# Patient Record
Sex: MALE | Race: BLACK OR AFRICAN AMERICAN | NOT HISPANIC OR LATINO | Employment: UNEMPLOYED | URBAN - NONMETROPOLITAN AREA
[De-identification: names, ages, dates, MRNs, and addresses within clinical notes are randomized per-mention and may not be internally consistent; named-entity substitution may affect disease eponyms.]

---

## 2020-04-24 PROBLEM — F29 PSYCHOSIS: Status: ACTIVE | Noted: 2020-04-24

## 2020-04-26 PROBLEM — F16.20: Status: ACTIVE | Noted: 2020-04-26

## 2020-04-26 PROBLEM — F29 PSYCHOSIS: Status: RESOLVED | Noted: 2020-04-24 | Resolved: 2020-04-26

## 2023-10-19 PROBLEM — I67.1 CEREBRAL ANEURYSM: Status: ACTIVE | Noted: 2023-10-19

## 2023-10-19 PROBLEM — R05.3 CHRONIC COUGH: Status: ACTIVE | Noted: 2023-10-19

## 2023-10-19 PROBLEM — Z22.7 TB LUNG, LATENT: Status: ACTIVE | Noted: 2023-10-19

## 2023-10-19 PROBLEM — R59.0 HILAR LYMPHADENOPATHY: Status: ACTIVE | Noted: 2023-10-19

## 2023-10-19 PROBLEM — H49.9: Status: ACTIVE | Noted: 2023-10-19

## 2023-10-19 PROBLEM — R63.4 WEIGHT LOSS: Status: ACTIVE | Noted: 2023-10-19

## 2023-11-20 PROBLEM — H49.21 CN VI PALSY, RIGHT EYE: Status: ACTIVE | Noted: 2023-11-20

## 2023-11-20 PROBLEM — D86.83 SARCOID UVEITIS OF BOTH EYES: Status: ACTIVE | Noted: 2023-11-20

## 2023-11-27 PROBLEM — D86.0 SARCOIDOSIS OF LUNG: Status: ACTIVE | Noted: 2023-11-27

## 2024-07-15 ENCOUNTER — HOSPITAL ENCOUNTER (OUTPATIENT)
Dept: RADIOLOGY | Facility: HOSPITAL | Age: 44
Discharge: HOME | End: 2024-07-15
Attending: INTERNAL MEDICINE
Payer: MEDICAID

## 2024-07-15 DIAGNOSIS — D86.0 SARCOIDOSIS OF LUNG: ICD-10-CM

## 2024-07-15 DIAGNOSIS — J84.9 INTERSTITIAL PULMONARY DISEASE, UNSPECIFIED: ICD-10-CM

## 2024-07-15 PROCEDURE — 71250 CT THORAX DX C-: CPT | Mod: TC

## 2024-08-23 ENCOUNTER — TELEPHONE (OUTPATIENT)
Dept: OPHTHALMOLOGY | Facility: CLINIC | Age: 44
End: 2024-08-23
Payer: MEDICAID

## 2024-08-23 NOTE — TELEPHONE ENCOUNTER
Spoke with pt to schedule strab eval in [place] per referral. Informed pt that Dr. Levin has sooner availability at [facility]. Pt declined, stated that he would like to be seen in [place].   
Hpi Title: Evaluation of Skin Lesions
How Severe Are Your Spot(S)?: mild
Have Your Spot(S) Been Treated In The Past?: has not been treated

## 2024-08-23 NOTE — TELEPHONE ENCOUNTER
Attempted to contact pt to schedule strab/diplopia eval in [place] per referral. No answer/noVM.     -Nava

## 2024-11-05 ENCOUNTER — TELEPHONE (OUTPATIENT)
Dept: OPHTHALMOLOGY | Facility: CLINIC | Age: 44
End: 2024-11-05

## 2024-11-05 ENCOUNTER — OFFICE VISIT (OUTPATIENT)
Dept: OPHTHALMOLOGY | Facility: CLINIC | Age: 44
End: 2024-11-05
Payer: MEDICAID

## 2024-11-05 DIAGNOSIS — H49.21 CN VI PALSY, RIGHT EYE: Primary | ICD-10-CM

## 2024-11-05 DIAGNOSIS — H50.00 INTERMITTENT ESOTROPIA OF BOTH EYES: ICD-10-CM

## 2024-11-05 PROBLEM — H49.9: Status: RESOLVED | Noted: 2023-10-19 | Resolved: 2024-11-05

## 2024-11-05 PROCEDURE — 92060 SENSORIMOTOR EXAMINATION: CPT | Mod: ,,, | Performed by: STUDENT IN AN ORGANIZED HEALTH CARE EDUCATION/TRAINING PROGRAM

## 2024-11-05 PROCEDURE — 99213 OFFICE O/P EST LOW 20 MIN: CPT | Mod: ,,, | Performed by: STUDENT IN AN ORGANIZED HEALTH CARE EDUCATION/TRAINING PROGRAM

## 2024-11-05 PROCEDURE — 1160F RVW MEDS BY RX/DR IN RCRD: CPT | Mod: CPTII,,, | Performed by: STUDENT IN AN ORGANIZED HEALTH CARE EDUCATION/TRAINING PROGRAM

## 2024-11-05 PROCEDURE — 1159F MED LIST DOCD IN RCRD: CPT | Mod: CPTII,,, | Performed by: STUDENT IN AN ORGANIZED HEALTH CARE EDUCATION/TRAINING PROGRAM

## 2024-11-05 NOTE — ASSESSMENT & PLAN NOTE
"Hx of latent TB and sarcoid associated uveitis - managed with ophthalmology, rheum, and pulmonology    Patient with one year of diplopia - feels like it started suddenly one day  Seen at [facility] on 10/20/23 and noted to have a -4 abduction deficit of right eye  MRI Brain w/ and w/o 10/23/23: "Mild presumed microvascular ischemic changes cerebral white matter including couple of small old white matter infarcts.  No evidence of acute infarction. No space-occupying mass or abnormally enhancing parenchymal lesion"  MRI Head/Orbits w/ and w/o 7/18/24: "Scattered foci of chronic microvascular ischemic changes in the cerebral white matter bilaterally.  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  No definite abnormalities involving either orbit.    11/5/24: Patient reports double vision most every day, worse when looking right, but does think it has gradually improved over the past few months  Exam with intermittent esotropia with mild lateral incomitance    Plan:  Presumed microvascular etiology given radiographic evidence of microvascular ischemic disease and some subjective improvement   Atypical case given patient age and time course of improvement  Given atypical features, subjective improvement, and only intermittent ET, will recommend observation for another 2 months  RTC 2 months  If symptoms worsen or measurements stable, can consider strab surgery  "

## 2024-11-05 NOTE — PROGRESS NOTES
"HPI    NP with hx of sarcoid uveitis OU and CN VI palsy OD. Pt is experiencing   horizontal diplopia for the last year. Symptoms happen daily mainly when   looking to the right. Pt also states OD eye turning for about 1 year. He   does feel like the double vision has slightly improved over the last few   months. Mainly notices double vision when looking to the right.  Denies any photophobia, eye redness today        Last edited by Yohannes Levin MD on 11/5/2024 11:45 AM.        ROS    Negative for: Constitutional, Gastrointestinal, Neurological, Skin,   Genitourinary, Musculoskeletal, HENT, Endocrine, Cardiovascular, Eyes,   Respiratory, Psychiatric, Allergic/Imm, Heme/Lymph  Last edited by Yohannes Levin MD on 11/5/2024 11:45 AM.        Assessment /Plan     For exam results, see Encounter Report.    CN VI palsy, right eye  -     Ambulatory referral/consult to Ophthalmology    Intermittent esotropia of both eyes        Problem List Items Addressed This Visit          Ophtho    CN VI palsy, right eye - Primary    Current Assessment & Plan     Hx of latent TB and sarcoid associated uveitis - managed with ophthalmology, rheum, and pulmonology    Patient with one year of diplopia - feels like it started suddenly one day  Seen at [facility] on 10/20/23 and noted to have a -4 abduction deficit of right eye  MRI Brain w/ and w/o 10/23/23: "Mild presumed microvascular ischemic changes cerebral white matter including couple of small old white matter infarcts.  No evidence of acute infarction. No space-occupying mass or abnormally enhancing parenchymal lesion"  MRI Head/Orbits w/ and w/o 7/18/24: "Scattered foci of chronic microvascular ischemic changes in the cerebral white matter bilaterally.  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  No definite abnormalities involving either orbit.    11/5/24: Patient reports double vision most every day, worse when looking right, but does " think it has gradually improved over the past few months  Exam with intermittent esotropia with mild lateral incomitance    Plan:  Presumed microvascular etiology given radiographic evidence of microvascular ischemic disease and some subjective improvement   Atypical case given patient age and time course of improvement  Given atypical features, subjective improvement, and only intermittent ET, will recommend observation for another 2 months  RTC 2 months  If symptoms worsen or measurements stable, can consider strab surgery         Intermittent esotropia of both eyes      Yohannes Levin MD  Pediatric Ophthalmology and Adult Strabismus  [facility]

## 2024-11-05 NOTE — TELEPHONE ENCOUNTER
----- Message from Ambika sent at 11/5/2024 10:16 AM CST -----  Regarding: Wrong Location  Type:Running  Late to Appointment    Patient is running late to today's appointment.      Name of Caller: Jennifer elsi leon location    Appointment Date/Time? 11/5    Physician:Yohannes Levin MD    Best Call Back Number:[phone]    Additional Information: Patient went to wrong location [facility]. Pt is asking if he can still be seen due to driving from 1 hour away.

## 2025-01-07 ENCOUNTER — OFFICE VISIT (OUTPATIENT)
Dept: OPHTHALMOLOGY | Facility: CLINIC | Age: 45
End: 2025-01-07
Payer: MEDICAID

## 2025-01-07 DIAGNOSIS — H50.00 INTERMITTENT ESOTROPIA OF BOTH EYES: ICD-10-CM

## 2025-01-07 DIAGNOSIS — H49.21 CN VI PALSY, RIGHT EYE: Primary | ICD-10-CM

## 2025-01-07 PROCEDURE — 1159F MED LIST DOCD IN RCRD: CPT | Mod: CPTII,,, | Performed by: STUDENT IN AN ORGANIZED HEALTH CARE EDUCATION/TRAINING PROGRAM

## 2025-01-07 PROCEDURE — 99214 OFFICE O/P EST MOD 30 MIN: CPT | Mod: ,,, | Performed by: STUDENT IN AN ORGANIZED HEALTH CARE EDUCATION/TRAINING PROGRAM

## 2025-01-07 PROCEDURE — 92060 SENSORIMOTOR EXAMINATION: CPT | Mod: ,,, | Performed by: STUDENT IN AN ORGANIZED HEALTH CARE EDUCATION/TRAINING PROGRAM

## 2025-01-07 PROCEDURE — 1160F RVW MEDS BY RX/DR IN RCRD: CPT | Mod: CPTII,,, | Performed by: STUDENT IN AN ORGANIZED HEALTH CARE EDUCATION/TRAINING PROGRAM

## 2025-01-07 NOTE — ASSESSMENT & PLAN NOTE
"Hx of latent TB and sarcoid associated uveitis - managed with ophthalmology, rheum, and pulmonology    Patient with one year of diplopia - feels like it started suddenly one day  Seen at [facility] on 10/20/23 and noted to have a -4 abduction deficit of right eye  MRI Brain w/ and w/o 10/23/23: "Mild presumed microvascular ischemic changes cerebral white matter including couple of small old white matter infarcts.  No evidence of acute infarction. No space-occupying mass or abnormally enhancing parenchymal lesion"  MRI Head/Orbits w/ and w/o 7/18/24: "Scattered foci of chronic microvascular ischemic changes in the cerebral white matter bilaterally.  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  No definite abnormalities involving either orbit.    11/5/24: Patient reports double vision most every day, worse when looking right, but does think it has gradually improved over the past few months  Exam with intermittent esotropia with mild lateral incomitance  --Presumed microvascular etiology given radiographic evidence of microvascular ischemic disease and some subjective improvement   Given atypical features, subjective improvement, and only intermittent ET, will recommend observation for another 2 months    1/7/25: Patient repots stable symptoms. Still having double vision in right gaze preventing him from driving. Is interested in surgical correction  Exam stable  Plan:  Discussed risks and benefits of strabismus surgery  Benefits include: realign eyes and improved binocularity  Risks include: Pain, bleeding infection, transient or permanent redness, less attractive appearance, decreased vision, loss of vision, undercorrection, overcorrection, double vision, need for future surgery    Patient understands and elects to proceed with surgery  Will tentatively plan for [facility] only  "

## 2025-01-07 NOTE — PROGRESS NOTES
"HPI    Patient is 45 yo male returning to clinic for 2 month f/u of intermittent   esotropia OU, CN VI palsy OD. Patient has h/o latent TB and sarcoid   associated uveitis. Patient states eye will still deviate inward.  Patient   reports no changes to horizontal diplopia when looking to the right. He   reports the double vision has prevented him from driving.  Patient denies any uveitis related symptoms over the last 2 months.   Patient is using Durezol drops qday OU.   Last edited by Yohannes Levin MD on 1/7/2025  1:02 PM.            Assessment /Plan     For exam results, see Encounter Report.    CN VI palsy, right eye    Intermittent esotropia of both eyes        Problem List Items Addressed This Visit          Ophtho    CN VI palsy, right eye - Primary    Current Assessment & Plan     Hx of latent TB and sarcoid associated uveitis - managed with ophthalmology, rheum, and pulmonology    Patient with one year of diplopia - feels like it started suddenly one day  Seen at [facility] on 10/20/23 and noted to have a -4 abduction deficit of right eye  MRI Brain w/ and w/o 10/23/23: "Mild presumed microvascular ischemic changes cerebral white matter including couple of small old white matter infarcts.  No evidence of acute infarction. No space-occupying mass or abnormally enhancing parenchymal lesion"  MRI Head/Orbits w/ and w/o 7/18/24: "Scattered foci of chronic microvascular ischemic changes in the cerebral white matter bilaterally.  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  No definite abnormalities involving either orbit.    11/5/24: Patient reports double vision most every day, worse when looking right, but does think it has gradually improved over the past few months  Exam with intermittent esotropia with mild lateral incomitance  --Presumed microvascular etiology given radiographic evidence of microvascular ischemic disease and some subjective improvement   Given " atypical features, subjective improvement, and only intermittent ET, will recommend observation for another 2 months    1/7/25: Patient repots stable symptoms. Still having double vision in right gaze preventing him from driving. Is interested in surgical correction  Exam stable  Plan:  Discussed risks and benefits of strabismus surgery  Benefits include: realign eyes and improved binocularity  Risks include: Pain, bleeding infection, transient or permanent redness, less attractive appearance, decreased vision, loss of vision, undercorrection, overcorrection, double vision, need for future surgery    Patient understands and elects to proceed with surgery  Will tentatively plan for [facility] only         Intermittent esotropia of both eyes      Yohannes Levin MD  Pediatric Ophthalmology and Adult Strabismus  [facility]

## 2025-01-10 ENCOUNTER — TELEPHONE (OUTPATIENT)
Dept: OPHTHALMOLOGY | Facility: CLINIC | Age: 45
End: 2025-01-10
Payer: MEDICAID

## 2025-01-10 DIAGNOSIS — H50.00 INTERMITTENT ESOTROPIA OF BOTH EYES: Primary | ICD-10-CM

## 2025-01-27 ENCOUNTER — HOSPITAL ENCOUNTER (OUTPATIENT)
Dept: RADIOLOGY | Facility: HOSPITAL | Age: 45
Discharge: HOME | End: 2025-01-27
Attending: INTERNAL MEDICINE
Payer: MEDICAID

## 2025-01-27 DIAGNOSIS — D86.0 SARCOIDOSIS OF LUNG: ICD-10-CM

## 2025-01-27 PROCEDURE — 71046 X-RAY EXAM CHEST 2 VIEWS: CPT | Mod: TC

## 2025-02-03 ENCOUNTER — LAB VISIT (OUTPATIENT)
Dept: LAB | Facility: HOSPITAL | Age: 45
End: 2025-02-03
Attending: INTERNAL MEDICINE
Payer: MEDICAID

## 2025-02-03 DIAGNOSIS — D86.9 SARCOID: ICD-10-CM

## 2025-02-03 LAB
ALBUMIN SERPL BCP-MCNC: 3.8 G/DL (ref 3.5–5.2)
ALP SERPL-CCNC: 86 U/L (ref 55–135)
ALT SERPL W/O P-5'-P-CCNC: 17 U/L (ref 10–44)
ANION GAP SERPL CALC-SCNC: 7 MMOL/L (ref 8–16)
AST SERPL-CCNC: 25 U/L (ref 10–40)
BASOPHILS # BLD AUTO: 0.02 K/UL (ref 0–0.2)
BASOPHILS NFR BLD: 0.2 % (ref 0–1.9)
BILIRUB SERPL-MCNC: 0.4 MG/DL (ref 0.1–1)
BUN SERPL-MCNC: 8 MG/DL (ref 6–20)
CALCIUM SERPL-MCNC: 8.9 MG/DL (ref 8.7–10.5)
CHLORIDE SERPL-SCNC: 105 MMOL/L (ref 95–110)
CO2 SERPL-SCNC: 23 MMOL/L (ref 23–29)
CREAT SERPL-MCNC: 1 MG/DL (ref 0.5–1.4)
CRP SERPL-MCNC: 3 MG/L (ref 0–8.2)
DIFFERENTIAL METHOD BLD: ABNORMAL
EOSINOPHIL # BLD AUTO: 0 K/UL (ref 0–0.5)
EOSINOPHIL NFR BLD: 0.1 % (ref 0–8)
ERYTHROCYTE [DISTWIDTH] IN BLOOD BY AUTOMATED COUNT: 12.8 % (ref 11.5–14.5)
ERYTHROCYTE [SEDIMENTATION RATE] IN BLOOD: 3 MM/HR (ref 0–10)
EST. GFR  (NO RACE VARIABLE): >60 ML/MIN/1.73 M^2
GLUCOSE SERPL-MCNC: 103 MG/DL (ref 70–110)
HCT VFR BLD AUTO: 43 % (ref 40–54)
HGB BLD-MCNC: 14.6 G/DL (ref 14–18)
IMM GRANULOCYTES # BLD AUTO: 0.03 K/UL (ref 0–0.04)
IMM GRANULOCYTES NFR BLD AUTO: 0.4 % (ref 0–0.5)
LYMPHOCYTES # BLD AUTO: 1.2 K/UL (ref 1–4.8)
LYMPHOCYTES NFR BLD: 14.4 % (ref 18–48)
MCH RBC QN AUTO: 29.2 PG (ref 27–31)
MCHC RBC AUTO-ENTMCNC: 34 G/DL (ref 32–36)
MCV RBC AUTO: 86 FL (ref 82–98)
MONOCYTES # BLD AUTO: 0.3 K/UL (ref 0.3–1)
MONOCYTES NFR BLD: 3 % (ref 4–15)
NEUTROPHILS # BLD AUTO: 6.7 K/UL (ref 1.8–7.7)
NEUTROPHILS NFR BLD: 81.9 % (ref 38–73)
NRBC BLD-RTO: 0 /100 WBC
PLATELET # BLD AUTO: 400 K/UL (ref 150–450)
PMV BLD AUTO: 9.8 FL (ref 9.2–12.9)
POTASSIUM SERPL-SCNC: 3.2 MMOL/L (ref 3.5–5.1)
PROT SERPL-MCNC: 7.4 G/DL (ref 6–8.4)
RBC # BLD AUTO: 5 M/UL (ref 4.6–6.2)
SODIUM SERPL-SCNC: 135 MMOL/L (ref 136–145)
WBC # BLD AUTO: 8.22 K/UL (ref 3.9–12.7)

## 2025-02-03 PROCEDURE — 85651 RBC SED RATE NONAUTOMATED: CPT | Performed by: INTERNAL MEDICINE

## 2025-02-03 PROCEDURE — 86140 C-REACTIVE PROTEIN: CPT | Performed by: INTERNAL MEDICINE

## 2025-02-03 PROCEDURE — 85025 COMPLETE CBC W/AUTO DIFF WBC: CPT | Performed by: INTERNAL MEDICINE

## 2025-02-03 PROCEDURE — 36415 COLL VENOUS BLD VENIPUNCTURE: CPT | Performed by: INTERNAL MEDICINE

## 2025-02-03 PROCEDURE — 80053 COMPREHEN METABOLIC PANEL: CPT | Performed by: INTERNAL MEDICINE

## 2025-02-28 ENCOUNTER — TELEPHONE (OUTPATIENT)
Dept: OPHTHALMOLOGY | Facility: CLINIC | Age: 45
End: 2025-02-28
Payer: MEDICAID

## 2025-03-05 ENCOUNTER — HOSPITAL ENCOUNTER (OUTPATIENT)
Facility: HOSPITAL | Age: 45
Discharge: HOME | End: 2025-03-05
Attending: STUDENT IN AN ORGANIZED HEALTH CARE EDUCATION/TRAINING PROGRAM | Admitting: STUDENT IN AN ORGANIZED HEALTH CARE EDUCATION/TRAINING PROGRAM
Payer: MEDICAID

## 2025-03-05 ENCOUNTER — ANESTHESIA EVENT (OUTPATIENT)
Dept: SURGERY | Facility: HOSPITAL | Age: 45
End: 2025-03-05
Payer: MEDICAID

## 2025-03-05 ENCOUNTER — ANESTHESIA (OUTPATIENT)
Dept: SURGERY | Facility: HOSPITAL | Age: 45
End: 2025-03-05
Payer: MEDICAID

## 2025-03-05 VITALS
SYSTOLIC BLOOD PRESSURE: 127 MMHG | DIASTOLIC BLOOD PRESSURE: 74 MMHG | WEIGHT: 222 LBS | HEART RATE: 85 BPM | BODY MASS INDEX: 31.08 KG/M2 | RESPIRATION RATE: 20 BRPM | TEMPERATURE: 98 F | HEIGHT: 71 IN | OXYGEN SATURATION: 99 %

## 2025-03-05 DIAGNOSIS — H50.00 ESOTROPIA OF BOTH EYES: ICD-10-CM

## 2025-03-05 DIAGNOSIS — H50.00 INTERMITTENT ESOTROPIA OF BOTH EYES: Primary | ICD-10-CM

## 2025-03-05 DIAGNOSIS — H49.21 CN VI PALSY, RIGHT EYE: ICD-10-CM

## 2025-03-05 PROCEDURE — 37000009 HC ANESTHESIA EA ADD 15 MINS: Performed by: STUDENT IN AN ORGANIZED HEALTH CARE EDUCATION/TRAINING PROGRAM

## 2025-03-05 PROCEDURE — 25000003 PHARM REV CODE 250: Performed by: NURSE ANESTHETIST, CERTIFIED REGISTERED

## 2025-03-05 PROCEDURE — 25000003 PHARM REV CODE 250

## 2025-03-05 PROCEDURE — 71000015 HC POSTOP RECOV 1ST HR: Performed by: STUDENT IN AN ORGANIZED HEALTH CARE EDUCATION/TRAINING PROGRAM

## 2025-03-05 PROCEDURE — 67311 REVISE EYE MUSCLE: CPT | Mod: LT,,, | Performed by: STUDENT IN AN ORGANIZED HEALTH CARE EDUCATION/TRAINING PROGRAM

## 2025-03-05 PROCEDURE — 36000707: Performed by: STUDENT IN AN ORGANIZED HEALTH CARE EDUCATION/TRAINING PROGRAM

## 2025-03-05 PROCEDURE — 37000008 HC ANESTHESIA 1ST 15 MINUTES: Performed by: STUDENT IN AN ORGANIZED HEALTH CARE EDUCATION/TRAINING PROGRAM

## 2025-03-05 PROCEDURE — 36000706: Performed by: STUDENT IN AN ORGANIZED HEALTH CARE EDUCATION/TRAINING PROGRAM

## 2025-03-05 PROCEDURE — 63600175 PHARM REV CODE 636 W HCPCS: Performed by: NURSE ANESTHETIST, CERTIFIED REGISTERED

## 2025-03-05 PROCEDURE — 71000044 HC DOSC ROUTINE RECOVERY FIRST HOUR: Performed by: STUDENT IN AN ORGANIZED HEALTH CARE EDUCATION/TRAINING PROGRAM

## 2025-03-05 PROCEDURE — 25000003 PHARM REV CODE 250: Performed by: STUDENT IN AN ORGANIZED HEALTH CARE EDUCATION/TRAINING PROGRAM

## 2025-03-05 RX ORDER — PHENYLEPHRINE HYDROCHLORIDE 25 MG/ML
SOLUTION/ DROPS OPHTHALMIC
Status: DISCONTINUED
Start: 2025-03-05 | End: 2025-03-05 | Stop reason: HOSPADM

## 2025-03-05 RX ORDER — NEOMYCIN SULFATE, POLYMYXIN B SULFATE, AND DEXAMETHASONE 3.5; 10000; 1 MG/G; [USP'U]/G; MG/G
OINTMENT OPHTHALMIC
Status: DISCONTINUED
Start: 2025-03-05 | End: 2025-03-05 | Stop reason: HOSPADM

## 2025-03-05 RX ORDER — NEOMYCIN SULFATE, POLYMYXIN B SULFATE, AND DEXAMETHASONE 3.5; 10000; 1 MG/G; [USP'U]/G; MG/G
OINTMENT OPHTHALMIC
Status: DISCONTINUED | OUTPATIENT
Start: 2025-03-05 | End: 2025-03-05 | Stop reason: HOSPADM

## 2025-03-05 RX ORDER — FENTANYL CITRATE 50 UG/ML
25 INJECTION, SOLUTION INTRAMUSCULAR; INTRAVENOUS EVERY 5 MIN PRN
Status: DISCONTINUED | OUTPATIENT
Start: 2025-03-05 | End: 2025-03-05 | Stop reason: HOSPADM

## 2025-03-05 RX ORDER — PHENYLEPHRINE HYDROCHLORIDE 25 MG/ML
SOLUTION/ DROPS OPHTHALMIC
Status: DISCONTINUED | OUTPATIENT
Start: 2025-03-05 | End: 2025-03-05 | Stop reason: HOSPADM

## 2025-03-05 RX ORDER — HYDROCODONE BITARTRATE AND ACETAMINOPHEN 5; 325 MG/1; MG/1
1 TABLET ORAL EVERY 6 HOURS PRN
Qty: 12 TABLET | Refills: 0 | Status: SHIPPED | OUTPATIENT
Start: 2025-03-05 | End: 2025-03-08

## 2025-03-05 RX ORDER — FENTANYL CITRATE 50 UG/ML
INJECTION, SOLUTION INTRAMUSCULAR; INTRAVENOUS
Status: DISCONTINUED | OUTPATIENT
Start: 2025-03-05 | End: 2025-03-05

## 2025-03-05 RX ORDER — GLUCAGON 1 MG
1 KIT INJECTION
Status: DISCONTINUED | OUTPATIENT
Start: 2025-03-05 | End: 2025-03-05 | Stop reason: HOSPADM

## 2025-03-05 RX ORDER — MIDAZOLAM HYDROCHLORIDE 1 MG/ML
INJECTION INTRAMUSCULAR; INTRAVENOUS
Status: DISCONTINUED | OUTPATIENT
Start: 2025-03-05 | End: 2025-03-05

## 2025-03-05 RX ORDER — ONDANSETRON HYDROCHLORIDE 2 MG/ML
4 INJECTION, SOLUTION INTRAVENOUS DAILY PRN
Status: DISCONTINUED | OUTPATIENT
Start: 2025-03-05 | End: 2025-03-05 | Stop reason: HOSPADM

## 2025-03-05 RX ORDER — PROPOFOL 10 MG/ML
VIAL (ML) INTRAVENOUS
Status: DISCONTINUED | OUTPATIENT
Start: 2025-03-05 | End: 2025-03-05

## 2025-03-05 RX ORDER — PHENYLEPHRINE HYDROCHLORIDE 10 MG/ML
INJECTION INTRAVENOUS
Status: DISCONTINUED | OUTPATIENT
Start: 2025-03-05 | End: 2025-03-05

## 2025-03-05 RX ORDER — DEXMEDETOMIDINE HYDROCHLORIDE 100 UG/ML
INJECTION, SOLUTION INTRAVENOUS
Status: DISCONTINUED | OUTPATIENT
Start: 2025-03-05 | End: 2025-03-05

## 2025-03-05 RX ORDER — LIDOCAINE HYDROCHLORIDE 20 MG/ML
INJECTION, SOLUTION EPIDURAL; INFILTRATION; INTRACAUDAL; PERINEURAL
Status: DISCONTINUED | OUTPATIENT
Start: 2025-03-05 | End: 2025-03-05

## 2025-03-05 RX ADMIN — DEXMEDETOMIDINE 12 MCG: 100 INJECTION, SOLUTION, CONCENTRATE INTRAVENOUS at 03:03

## 2025-03-05 RX ADMIN — GLYCOPYRROLATE 0.2 MG: 0.2 INJECTION, SOLUTION INTRAMUSCULAR; INTRAVENOUS at 02:03

## 2025-03-05 RX ADMIN — SODIUM CHLORIDE: 0.9 INJECTION, SOLUTION INTRAVENOUS at 02:03

## 2025-03-05 RX ADMIN — LIDOCAINE HYDROCHLORIDE 50 MG: 20 INJECTION, SOLUTION EPIDURAL; INFILTRATION; INTRACAUDAL; PERINEURAL at 02:03

## 2025-03-05 RX ADMIN — MIDAZOLAM HYDROCHLORIDE 2 MG: 2 INJECTION, SOLUTION INTRAMUSCULAR; INTRAVENOUS at 02:03

## 2025-03-05 RX ADMIN — PHENYLEPHRINE HYDROCHLORIDE 200 MCG: 10 INJECTION INTRAVENOUS at 03:03

## 2025-03-05 RX ADMIN — HYDROCORTISONE SODIUM SUCCINATE 100 MG: 100 INJECTION, POWDER, FOR SOLUTION INTRAMUSCULAR; INTRAVENOUS at 03:03

## 2025-03-05 RX ADMIN — PROPOFOL 200 MG: 10 INJECTION, EMULSION INTRAVENOUS at 02:03

## 2025-03-05 RX ADMIN — FENTANYL CITRATE 100 MCG: 50 INJECTION, SOLUTION INTRAMUSCULAR; INTRAVENOUS at 03:03

## 2025-03-05 NOTE — H&P
Ophthalmology History and Physical     Patient Name:  Mckay Campo  MRN:  [id number]  :  1980    Allergies:  Patient has no known allergies.    CC:  Here for Surgery    HPI:  Patient presenting for strabismus surgery.    Interval History: No significant changes to past medical history, allergies, or medications.    ROS:  No fevers, chills, chest pain, shortness of breath, nausea or emesis         Past Medical History:   Diagnosis Date    Depression     Lymphadenopathy     Psychosis     Sleep difficulties     Substance abuse      Family History   Problem Relation Name Age of Onset    Cancer Mother      Diabetes Father       Past Surgical History:   Procedure Laterality Date    BRONCHOALVEOLAR LAVAGE N/A 2023    Procedure: LAVAGE, BRONCHOALVEOLAR;  Surgeon: Stanley De Jesus MD;  Location: [facility] OR;  Service: Pulmonary;  Laterality: N/A;    BRONCHOSCOPY N/A 2023    Procedure: Bronchoscopy;  Surgeon: Stanley De Jesus MD;  Location: [facility] OR;  Service: Pulmonary;  Laterality: N/A;  with flouro    BRONCHOSCOPY, WITH BRUSH BIOPSY N/A 2023    Procedure: BRONCHOSCOPY, WITH BRUSH BIOPSY;  Surgeon: Stanley De Jesus MD;  Location: [facility] OR;  Service: Pulmonary;  Laterality: N/A;    ENDOBRONCHIAL ULTRASOUND N/A 2023    Procedure: ENDOBRONCHIAL ULTRASOUND (EBUS);  Surgeon: Stanley De Jesus MD;  Location: [facility] OR;  Service: Pulmonary;  Laterality: N/A;       Medications marked as taking:  [unfilled]    Vital Signs:  There were no vitals taken for this visit.    Ophthalmology Exam:  Per last ophthalmology clinic note    Heart Exam: As per anesthesia    Lung Exam:  As per anesthesia    Assessment and Plan:     Mckay Campo is a 44 y.o. male presenting for strabismus surgery, both eyes (plan for intraoperative decision for which eye is operated on based on forced duction testing).    -New written consent present   -Plan to proceed to OR as planned

## 2025-03-05 NOTE — TRANSFER OF CARE
"Anesthesia Transfer of Care Note    Patient: Mckay Campo    Procedure(s) Performed: Procedure(s) (LRB):  STRABISMUS SURGERY (Left)    Patient location: PACU    Anesthesia Type: general    Transport from OR: Transported from OR on room air with adequate spontaneous ventilation    Post pain: adequate analgesia    Post assessment: no apparent anesthetic complications    Post vital signs: stable    Level of consciousness: sedated    Nausea/Vomiting: no nausea/vomiting    Complications: none    Transfer of care protocol was followed      Last vitals: Visit Vitals  /65   Pulse 68   Temp 36.6 °C (97.9 °F) (Oral)   Resp 18   Ht 5' 11" (1.803 m)   Wt 100.7 kg (222 lb 0.1 oz)   SpO2 99%   BMI 30.96 kg/m²     "

## 2025-03-05 NOTE — H&P
"Ophthalmology History and Physical     Patient Name:  Mckay Campo  MRN:  [id number]  :  1980    Allergies:  Patient has no known allergies.    CC:  Here for Surgery    HPI:  Patient presenting for strabismus surgery.    Interval History: No significant changes to past medical history, allergies, or medications.    ROS:  No fevers, chills, chest pain, shortness of breath, nausea or emesis         Past Medical History:   Diagnosis Date    Depression     Lymphadenopathy     Psychosis     Sleep difficulties     Substance abuse      Family History   Problem Relation Name Age of Onset    Cancer Mother      Diabetes Father       Past Surgical History:   Procedure Laterality Date    BRONCHOALVEOLAR LAVAGE N/A 2023    Procedure: LAVAGE, BRONCHOALVEOLAR;  Surgeon: Stanley De Jesus MD;  Location: [facility] OR;  Service: Pulmonary;  Laterality: N/A;    BRONCHOSCOPY N/A 2023    Procedure: Bronchoscopy;  Surgeon: Stanley De Jesus MD;  Location: [facility] OR;  Service: Pulmonary;  Laterality: N/A;  with flouro    BRONCHOSCOPY, WITH BRUSH BIOPSY N/A 2023    Procedure: BRONCHOSCOPY, WITH BRUSH BIOPSY;  Surgeon: Stanley De Jesus MD;  Location: [facility] OR;  Service: Pulmonary;  Laterality: N/A;    ENDOBRONCHIAL ULTRASOUND N/A 2023    Procedure: ENDOBRONCHIAL ULTRASOUND (EBUS);  Surgeon: Stanley De Jesus MD;  Location: [facility] OR;  Service: Pulmonary;  Laterality: N/A;       Medications marked as taking:  [unfilled]    Vital Signs:  /65   Pulse 68   Temp 97.9 °F (36.6 °C) (Oral)   Resp 18   Ht 5' 11" (1.803 m)   Wt 100.7 kg (222 lb 0.1 oz)   SpO2 99%   BMI 30.96 kg/m²     Ophthalmology Exam:  Per last ophthalmology clinic note    Heart Exam: As per anesthesia    Lung Exam:  As per anesthesia    Assessment and Plan:     Mckay Campo is a 44 y.o. male presenting for strabismus surgery, BOTH eye  Discussed with patient that will do intra-op forced duction testing to " determine which eye to operate on. If forced ductions positive, will do RIGHT MR recession. If forced ductions negative, will do LEFT MR recession. Patient verbalized understanding and signed new consent for both eyes.     -Written consent present   -Plan to proceed to OR as planned      Yohannes Levin MD  Pediatric Ophthalmology and Adult Strabismus  [facility]

## 2025-03-05 NOTE — DISCHARGE INSTRUCTIONS
ACTIVITY LEVEL:  If you received sedation or an anesthetic, you may feel sleepy for several hours. Rest until you are more awake. Gradually resume your normal activities in two days. Children may return to school in 3-4 days. It is all right to watch television or to read. Swimming is permitted in two weeks.    CARE OF INCISION:  A blood-tinged discharge from the eye is normal. This can be gently washed away with a clean, damp wash cloth. Do not use water, gauze, or cotton to wipe the lids. The morning after surgery, you may have difficulty opening your eyes. This is normal. If dry blood or secretions are holding the lids together, you may open the eyes by gently  the lids from above and below. Please wash your hands thoroughly before doing this. If the lids dont open, do not force them apart. (A child will cry and the tears will soften the secretions and a parent can try again later.) Use cool compresses to the eyes for 24 hours, if tolerated for comfort. Place maxitrol ointment in eyes three times per day for 7 days     BATHING:  Keep your face out of water for seven days after surgery    DIET:  At home, continue with liquids, and if there is no nausea, you may eat a soft diet. Gradually resume your normal diet.    PAIN:  If needed for discomfort, you can use cold compresses and take Tylenol (usual recommended dose) every four hours. Generally, do not take Tylenol more than four times a day.  If you were prescribed a narcotic, you may take as prescribed.     WHEN TO CALL THE DOCTOR:   Any increase in the amount of swelling of the eyes and adjacent tissues   Heavy yellow discharge from the eyes   Fever over 101ºF (38.4ºC)    A purple discoloration of the lower lids is common. It appears a few days after surgery and does not affect healing. You may experience double vision after surgery. This is normal and should disappear in a few days or weeks. Prescription glasses may be worn unless otherwise  instructed. The eyes may be unusually sensitive to light for several days. Dark sunglasses will help.    FOR EMERGENCIES:  If any unusual problems or difficulties occur, contact Dr. Levin or the resident at [phone] (page ) or at the Clinic office, [phone].

## 2025-03-05 NOTE — OP NOTE
"Patient Name: Mckay Campo  Date of Surgery: 3/5/2025  Medical Record Number: [id number]  : 1980    Surgeon: Yohannes Levin MD  Assistant: Jamie Maguire MD    Pre-op Diagnosis:  1. Right cranial nerve VI palsy  2. Esotropia with incomitance, both eyes    Post-op Diagnosis:  1. Right cranial nerve VI palsy  2. Esotropia with incomitance, both eyes    Procedure:  1. Left medial rectus combined resection (3mm) and recession (6 mm) - "Vini procedure"    Anesthesia: General  Complications: none    INDICATION OF PROCEDURE  Mckay Campo is a 44 y.o. male with history of partial right cranial nerve palsy of the right eye with incomitant esotropia and diplopia in side gaze. The strabismus has become progressively more difficult to control. The patient was identified in the main operating room holding area. The patient's parents were advised of the risks and benefits of surgical intervention, elected to proceed, and signed an informed consent document.    DESCRIPTION OF PROCEDURE:  The patient was identified in the preoperative holding area and brought to the operating room where anesthesia monitoring was established and general anesthesia induced in the supine position. The area about both eyes was then prepped and draped in the usual sterile fashion. A drop of 2.5% phenylephrine was applied to each eye.    Attention was turned to the right eye and a speculum was placed. Forced duction testing was performed and was negative for any restriction to abduction of the right eye. Because of this, a left medial rectus combined resect-recess procedure was planned.    Attention was turned to the left eye and a lid speculum was placed. A fornix incision was initially planned but the limbal conjunctiva tore, so it was converted to a limbal peritomy. A 90 degree conjunctival peritomy was created nasally. Dissection was carried out in the superior nasal and inferior nasal quadrants to reveal bare sclera. ELLIE Adrian" hook, followed by a Green hook, were used to engage the left medial rectus muscle. Overlying Tenon's attachments were severed with Carl scissors. A double-armed suture of 6-0 vicryl was passed through the muscle at a point 3mm posterior tot he insertion first with a central bite, then near its insertion with locking bites at both poles. A hemostat was then placed anterior to the sutures and the muscle was then severed from the globe with Carl scissors. The anterior muscle stump was cut away from the globe. Locking forceps were applied to both poles of the original muscle insertion and the globe positioned in abduction. The vicryl suture arms were passed at one-half scleral depth 6 mm posterior to the original muscle insertion as measured with calipers. The muscle was tied down to the globe and found to be stable in its new position. The traction sutures were removed. The conjunctiva was closed with interrupted sutures of 6-0 plain gut.     The eyelid speculum was then removed. A drop of dilute Betadine solution was placed in both eyes followed by Maxitrol ophthalmic ointment. The patient was awakened from anesthesia and taken to the postoperative recovery area in stable condition, having tolerated the procedure well.    Dr. Levin was present for and scrubbed for the entire case.

## 2025-03-05 NOTE — ANESTHESIA PREPROCEDURE EVALUATION
03/05/2025  Mckay Campo is a 44 y.o., male.    Pre-operative evaluation for Procedure(s) (LRB):  STRABISMUS SURGERY (Right)    Mckay Campo is a 44 y.o. male       Prev airway:   Intubation     Date/Time: 11/8/2023 10:10 AM     Performed by: El Andino CRNA  Authorized by: Jayden Roque MD    Intubation:     Induction:  Intravenous    Intubated:  Postinduction    Mask Ventilation:  Easy mask    Attempts:  1    Attempted By:  Student    Method of Intubation:  Direct    Blade:  Dougie 3    Laryngeal View Grade: Grade IIA - cords partially seen      Difficult Airway Encountered?: No      Complications:  None    Airway Device:  Oral endotracheal tube    Airway Device Size:  9.0    Style/Cuff Inflation:  Cuffed (inflated to minimal occlusive pressure)    Inflation Amount (mL):  5    Tube secured:  21    2D Echo: none on record      EKG:   Vent. Rate : 087 BPM     Atrial Rate : 087 BPM      P-R Int : 140 ms          QRS Dur : 094 ms       QT Int : 374 ms       P-R-T Axes : 076 068 027 degrees      QTc Int : 450 ms     Normal sinus rhythm   Nonspecific T wave abnormality   No previous ECGs available   Confirmed by Sammy Way MD ([id number]) on 11/9/2023 7:57:10 AM         Problem List[1]    Review of patient's allergies indicates:  No Known Allergies    Past Surgical History:   Procedure Laterality Date    BRONCHOALVEOLAR LAVAGE N/A 11/8/2023    Procedure: LAVAGE, BRONCHOALVEOLAR;  Surgeon: Stanley De Jesus MD;  Location: [facility];  Service: Pulmonary;  Laterality: N/A;    BRONCHOSCOPY N/A 11/8/2023    Procedure: Bronchoscopy;  Surgeon: Stanley De Jesus MD;  Location: [facility] OR;  Service: Pulmonary;  Laterality: N/A;  with flouro    BRONCHOSCOPY, WITH BRUSH BIOPSY N/A 11/8/2023    Procedure: BRONCHOSCOPY, WITH BRUSH BIOPSY;  Surgeon: Stanley De Jesus MD;  Location: [facility]  "OR;  Service: Pulmonary;  Laterality: N/A;    ENDOBRONCHIAL ULTRASOUND N/A 11/8/2023    Procedure: ENDOBRONCHIAL ULTRASOUND (EBUS);  Surgeon: Stanley De Jesus MD;  Location: [facility] OR;  Service: Pulmonary;  Laterality: N/A;         Vital Signs:  Temp:  [36.6 °C (97.9 °F)]   Pulse:  [68]   Resp:  [18]   BP: (118)/(65)   SpO2:  [99 %]       CBC: No results for input(s): "WBC", "RBC", "HGB", "HCT", "PLT", "MCV", "MCH", "MCHC" in the last 72 hours.    CMP: No results for input(s): "NA", "K", "CL", "CO2", "BUN", "CREATININE", "GLU", "MG", "PHOS", "CALCIUM", "ALBUMIN", "PROT", "ALKPHOS", "ALT", "AST", "BILITOT" in the last 72 hours.    INR  No results for input(s): "PT", "INR", "PROTIME", "APTT" in the last 72 hours.                Pre-op Assessment    I have reviewed the Patient Summary Reports.     I have reviewed the Nursing Notes. I have reviewed the NPO Status.   I have reviewed the Medications.   Steroids Taken In Past Year: Prednisone    Review of Systems  Anesthesia Hx:  No problems with previous Anesthesia   History of prior surgery of interest to airway management or planning:          Denies Family Hx of Anesthesia complications.    Denies Personal Hx of Anesthesia complications.                    Social:  Smoker       Hematology/Oncology:  Hematology Normal   Oncology Normal                                   EENT/Dental:  EENT/Dental Normal           Cardiovascular:  Cardiovascular Normal     Denies Hypertension.             Denies ACUÑA.                              Pulmonary:        Denies Recent URI.                 Hepatic/GI:  Hepatic/GI Normal     Denies GERD.                Neurological:    Neuromuscular Disease,                                 Neuromuscular Disease   Endocrine:  Endocrine Normal            Psych:  Psychiatric History                  Physical Exam  General: Well nourished, Cooperative and Alert    Airway:  Mallampati: II / II  Mouth Opening: Normal  TM Distance: Normal  Tongue: " Normal  Neck ROM: Normal ROM    Dental:  Intact        Anesthesia Plan  Type of Anesthesia, risks & benefits discussed:    Anesthesia Type: Gen Supraglottic Airway, Gen ETT  Intra-op Monitoring Plan: Standard ASA Monitors  Post Op Pain Control Plan: multimodal analgesia and IV/PO Opioids PRN  Induction:  IV  Airway Plan: Direct, Post-Induction  Informed Consent: Informed consent signed with the Patient and all parties understand the risks and agree with anesthesia plan.  All questions answered.   ASA Score: 3  Day of Surgery Review of History & Physical: H&P Update referred to the surgeon/provider.    Ready For Surgery From Anesthesia Perspective.     .           [1]   Patient Active Problem List  Diagnosis    Methylenedioxymethamphetamine (MDMA) dependence with current use    Hilar lymphadenopathy    TB lung, latent    Cerebral aneurysm    Weight loss    Chronic cough    Sarcoid uveitis of both eyes    CN VI palsy, right eye    Sarcoidosis of lung    Intermittent esotropia of both eyes

## 2025-03-05 NOTE — DISCHARGE SUMMARY
Discharge Summary  Ophthalmology Service      Admit Date: 3/5/2025     Discharge Date: 3/5/2025     Attending Physician: Yohannes Levin MD     Discharge Physician: Yohannes Levin MD    Discharged Condition: Good    Reason for Admission: Intermittent esotropia of both eyes [H50.00]  Esotropia of both eyes [H50.00]     Treatments/Procedures: Strabismus Surgery (see dictated report for details).    Hospital Course: Stable, dictated    Consults: None    Significant Diagnostic Studies: None    Disposition: Home    Patient Instructions:   - Resume same diet as prior to surgery  - Resume activity as tolerated with no swimming for 1 week  - Start Maxitrol ointment three times per day for 7 days   - Apply ice packs to surgical eye(s) for 72 hours as tolerated  - Call the Ophthalmology clinic to schedule a follow-up appointment with Dr. Levin     Patient Instructions:   Current Discharge Medication List        START taking these medications    Details   HYDROcodone-acetaminophen (NORCO) 5-325 mg per tablet Take 1 tablet by mouth every 6 (six) hours as needed for Pain.  Qty: 12 tablet, Refills: 0    Associated Diagnoses: Intermittent esotropia of both eyes           CONTINUE these medications which have NOT CHANGED    Details   difluprednate (DUREZOL) 0.05 % Drop ophthalmic solution Place 1 drop into both eyes 3 (three) times daily.  Qty: 5 mL, Refills: 3      erythromycin with ethanoL (EMGEL) 2 % gel AAA face breakouts qam  Qty: 30 g, Refills: 3    Associated Diagnoses: Cutaneous sarcoidosis; Acne vulgaris      folic acid (FOLVITE) 1 MG tablet Take 1 tablet (1 mg total) by mouth once daily.  Qty: 90 tablet, Refills: 3    Associated Diagnoses: Sarcoid      methotrexate 2.5 MG Tab Take 8 tablets (20 mg total) by mouth every 7 days.  Qty: 32 tablet, Refills: 5    Associated Diagnoses: Sarcoid      minocycline (MINOCIN,DYNACIN) 100 MG capsule 1 po bid with food  Qty: 60 capsule, Refills: 2    Associated Diagnoses: Cutaneous  sarcoidosis; Acne vulgaris      predniSONE (DELTASONE) 20 MG tablet Take 2 tablets (40 mg total) by mouth once daily.  Qty: 60 tablet, Refills: 5    Associated Diagnoses: Sarcoidosis of lung      tretinoin (RETIN-A) 0.025 % cream Apply pea sized amt to face every other night x 2 wks then nightly  Qty: 20 g, Refills: 11    Comments: Dx = grade 2 moderate acne; cutaneous sarcoidosis  Associated Diagnoses: Cutaneous sarcoidosis; Acne vulgaris      triamcinolone acetonide 0.1% (KENALOG) 0.1 % cream Aaa bid prn rash on body  Qty: 80 g, Refills: 3             Discharge Procedure Orders   Diet general

## 2025-03-06 NOTE — ANESTHESIA POSTPROCEDURE EVALUATION
Anesthesia Post Evaluation    Patient: Mckay Campo    Procedure(s) Performed: Procedure(s) (LRB):  STRABISMUS SURGERY (Left)    Final Anesthesia Type: general      Patient location during evaluation: PACU  Patient participation: Yes- Able to Participate  Level of consciousness: awake and alert  Post-procedure vital signs: reviewed and stable  Pain management: adequate  Airway patency: patent    PONV status at discharge: No PONV  Anesthetic complications: no      Cardiovascular status: blood pressure returned to baseline  Respiratory status: unassisted, room air and spontaneous ventilation  Hydration status: euvolemic  Follow-up not needed.              Vitals Value Taken Time   /74 03/05/25 16:15   Temp 36.5 °C (97.7 °F) 03/05/25 15:40   Pulse 85 03/05/25 16:15   Resp 20 03/05/25 16:15   SpO2 99 % 03/05/25 16:15         No case tracking events are documented in the log.      Pain/Lissa Score: Lissa Score: 10 (3/5/2025  4:15 PM)

## 2025-03-13 ENCOUNTER — OFFICE VISIT (OUTPATIENT)
Dept: OPHTHALMOLOGY | Facility: CLINIC | Age: 45
End: 2025-03-13
Payer: MEDICAID

## 2025-03-13 DIAGNOSIS — H49.21 CN VI PALSY, RIGHT EYE: Primary | ICD-10-CM

## 2025-03-13 PROCEDURE — 99024 POSTOP FOLLOW-UP VISIT: CPT | Mod: ,,, | Performed by: STUDENT IN AN ORGANIZED HEALTH CARE EDUCATION/TRAINING PROGRAM

## 2025-03-13 PROCEDURE — 99212 OFFICE O/P EST SF 10 MIN: CPT | Mod: PBBFAC | Performed by: STUDENT IN AN ORGANIZED HEALTH CARE EDUCATION/TRAINING PROGRAM

## 2025-03-13 PROCEDURE — 99999 PR PBB SHADOW E&M-EST. PATIENT-LVL II: CPT | Mod: PBBFAC,,, | Performed by: STUDENT IN AN ORGANIZED HEALTH CARE EDUCATION/TRAINING PROGRAM

## 2025-03-13 PROCEDURE — 1160F RVW MEDS BY RX/DR IN RCRD: CPT | Mod: CPTII,,, | Performed by: STUDENT IN AN ORGANIZED HEALTH CARE EDUCATION/TRAINING PROGRAM

## 2025-03-13 PROCEDURE — 1159F MED LIST DOCD IN RCRD: CPT | Mod: CPTII,,, | Performed by: STUDENT IN AN ORGANIZED HEALTH CARE EDUCATION/TRAINING PROGRAM

## 2025-03-13 NOTE — ASSESSMENT & PLAN NOTE
"Hx of latent TB and sarcoid associated uveitis - managed with ophthalmology, rheum, and pulmonology    Patient with one year of diplopia - feels like it started suddenly one day  Seen at [facility] on 10/20/23 and noted to have a -4 abduction deficit of right eye  MRI Brain w/ and w/o 10/23/23: "Mild presumed microvascular ischemic changes cerebral white matter including couple of small old white matter infarcts.  No evidence of acute infarction. No space-occupying mass or abnormally enhancing parenchymal lesion"  MRI Head/Orbits w/ and w/o 7/18/24: "Scattered foci of chronic microvascular ischemic changes in the cerebral white matter bilaterally.  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  No definite abnormalities involving either orbit.    11/5/24: Patient reports double vision most every day, worse when looking right, but does think it has gradually improved over the past few months  Exam with intermittent esotropia with mild lateral incomitance  --Presumed microvascular etiology given radiographic evidence of microvascular ischemic disease and some subjective improvement   Given atypical features, subjective improvement, and only intermittent ET, will recommend observation for another 2 months    1/7/25: Patient repots stable symptoms. Still having double vision in right gaze preventing him from driving. Is interested in surgical correction  Exam stable    3/5/25: strab surgery - LMR - recess 6, resect 3 (Vini procedure)  Ortho in all directions of gaze  Eye healing well    Plan:   Taper maxitrol once daily for one week then stop  Can resume normal activities  RTC 1 month    "

## 2025-03-13 NOTE — PROGRESS NOTES
"HPI    Mckay Campo is a 44 y.o. male who comes in for one week post op eval. S/p   LMR combined resection (3mm) - Vini procedure. Patient is happy with   surgical results. He reports since surgery he hasn't noticed any double   vision. Denies any eye pain.     Eye meds: Maxitrol completed course    History obtained by parent/guardian accompanying patient at today's   appointment       Last edited by Joe Ruiz MA on 3/13/2025  1:53 PM.        ROS    Negative for: Constitutional, Gastrointestinal, Neurological, Skin,   Genitourinary, Musculoskeletal, HENT, Endocrine, Cardiovascular, Eyes,   Respiratory, Psychiatric, Allergic/Imm, Heme/Lymph  Last edited by Yohannes Levin MD on 3/13/2025  3:27 PM.        Assessment /Plan     For exam results, see Encounter Report.    CN VI palsy, right eye        Problem List Items Addressed This Visit          Ophtho    CN VI palsy, right eye - Primary    Current Assessment & Plan   Hx of latent TB and sarcoid associated uveitis - managed with ophthalmology, rheum, and pulmonology    Patient with one year of diplopia - feels like it started suddenly one day  Seen at [facility] on 10/20/23 and noted to have a -4 abduction deficit of right eye  MRI Brain w/ and w/o 10/23/23: "Mild presumed microvascular ischemic changes cerebral white matter including couple of small old white matter infarcts.  No evidence of acute infarction. No space-occupying mass or abnormally enhancing parenchymal lesion"  MRI Head/Orbits w/ and w/o 7/18/24: "Scattered foci of chronic microvascular ischemic changes in the cerebral white matter bilaterally.  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  No definite abnormalities involving either orbit.    11/5/24: Patient reports double vision most every day, worse when looking right, but does think it has gradually improved over the past few months  Exam with intermittent esotropia with mild lateral " incomitance  --Presumed microvascular etiology given radiographic evidence of microvascular ischemic disease and some subjective improvement   Given atypical features, subjective improvement, and only intermittent ET, will recommend observation for another 2 months    1/7/25: Patient repots stable symptoms. Still having double vision in right gaze preventing him from driving. Is interested in surgical correction  Exam stable    3/5/25: strab surgery - LMR - recess 6, resect 3 (Vini procedure)  Ortho in all directions of gaze  Eye healing well    Plan:   Taper maxitrol once daily for one week then stop  Can resume normal activities  RTC 1 month               Yohannse Levin MD  Pediatric Ophthalmology and Adult Strabismus  [facility]

## 2025-04-07 ENCOUNTER — TELEPHONE (OUTPATIENT)
Dept: OPHTHALMOLOGY | Facility: CLINIC | Age: 45
End: 2025-04-07
Payer: MEDICAID

## 2025-04-07 NOTE — TELEPHONE ENCOUNTER
Spoke with pt and informed him that he can see Dr. Levin as needed for strabismus care per request. Pt verbalized understanding.    -Nava   ----- Message from Marta sent at 4/7/2025  9:33 AM CDT -----  Regarding: Reschedule  Contact: [phone]  Reschedule Existing Appointment Current appt date:4/7/25 Type of appt :Opt Physician:natalee  Reason for rescheduling:Unable to keep  Caller:Mckay Campo  Contact Preference:[phone]

## 2025-08-28 ENCOUNTER — LAB VISIT (OUTPATIENT)
Dept: LAB | Facility: HOSPITAL | Age: 45
End: 2025-08-28
Attending: INTERNAL MEDICINE
Payer: MEDICAID

## 2025-08-28 DIAGNOSIS — Z79.631 METHOTREXATE, LONG TERM, CURRENT USE: ICD-10-CM

## 2025-08-28 PROBLEM — D86.9 SARCOID: Status: ACTIVE | Noted: 2025-08-28

## 2025-08-28 PROBLEM — D86.0 SARCOIDOSIS OF LUNG: Status: RESOLVED | Noted: 2023-11-27 | Resolved: 2025-08-28

## 2025-08-28 PROBLEM — Z79.52 CURRENT CHRONIC USE OF SYSTEMIC STEROIDS: Status: ACTIVE | Noted: 2025-08-28

## 2025-08-28 PROBLEM — D84.9 IMMUNOCOMPROMISED: Status: ACTIVE | Noted: 2025-08-28

## 2025-08-28 LAB
ABSOLUTE EOSINOPHIL (OHS): 0.09 K/UL
ABSOLUTE MONOCYTE (OHS): 0.35 K/UL (ref 0.3–1)
ABSOLUTE NEUTROPHIL COUNT (OHS): 7.14 K/UL (ref 1.8–7.7)
ALBUMIN SERPL BCP-MCNC: 3.8 G/DL (ref 3.5–5.2)
ALP SERPL-CCNC: 95 UNIT/L (ref 40–150)
ALT SERPL W/O P-5'-P-CCNC: 19 UNIT/L (ref 10–44)
ANION GAP (OHS): 9 MMOL/L (ref 8–16)
AST SERPL-CCNC: 37 UNIT/L (ref 11–45)
BASOPHILS # BLD AUTO: 0.04 K/UL
BASOPHILS NFR BLD AUTO: 0.4 %
BILIRUB SERPL-MCNC: 0.5 MG/DL (ref 0.1–1)
BUN SERPL-MCNC: 11 MG/DL (ref 6–20)
CALCIUM SERPL-MCNC: 9.2 MG/DL (ref 8.7–10.5)
CHLORIDE SERPL-SCNC: 108 MMOL/L (ref 95–110)
CO2 SERPL-SCNC: 25 MMOL/L (ref 23–29)
CREAT SERPL-MCNC: 1 MG/DL (ref 0.5–1.4)
CRP SERPL-MCNC: 5.9 MG/L
ERYTHROCYTE [DISTWIDTH] IN BLOOD BY AUTOMATED COUNT: 12.9 % (ref 11.5–14.5)
ERYTHROCYTE [SEDIMENTATION RATE] IN BLOOD: 7 MM/HR
GFR SERPLBLD CREATININE-BSD FMLA CKD-EPI: >60 ML/MIN/1.73/M2
GLUCOSE SERPL-MCNC: 144 MG/DL (ref 70–110)
HCT VFR BLD AUTO: 44.5 % (ref 40–54)
HGB BLD-MCNC: 15 GM/DL (ref 14–18)
IMM GRANULOCYTES # BLD AUTO: 0.03 K/UL (ref 0–0.04)
IMM GRANULOCYTES NFR BLD AUTO: 0.3 % (ref 0–0.5)
LYMPHOCYTES # BLD AUTO: 1.42 K/UL (ref 1–4.8)
MCH RBC QN AUTO: 28.6 PG (ref 27–31)
MCHC RBC AUTO-ENTMCNC: 33.7 G/DL (ref 32–36)
MCV RBC AUTO: 85 FL (ref 82–98)
NUCLEATED RBC (/100WBC) (OHS): 0 /100 WBC
PLATELET # BLD AUTO: 350 K/UL (ref 150–450)
PMV BLD AUTO: 9.7 FL (ref 9.2–12.9)
POTASSIUM SERPL-SCNC: 3.5 MMOL/L (ref 3.5–5.1)
PROT SERPL-MCNC: 7.7 GM/DL (ref 6–8.4)
RBC # BLD AUTO: 5.24 M/UL (ref 4.6–6.2)
RELATIVE EOSINOPHIL (OHS): 1 %
RELATIVE LYMPHOCYTE (OHS): 15.7 % (ref 18–48)
RELATIVE MONOCYTE (OHS): 3.9 % (ref 4–15)
RELATIVE NEUTROPHIL (OHS): 78.7 % (ref 38–73)
SODIUM SERPL-SCNC: 142 MMOL/L (ref 136–145)
WBC # BLD AUTO: 9.07 K/UL (ref 3.9–12.7)

## 2025-08-28 PROCEDURE — 36415 COLL VENOUS BLD VENIPUNCTURE: CPT

## 2025-08-28 PROCEDURE — 85025 COMPLETE CBC W/AUTO DIFF WBC: CPT

## 2025-08-28 PROCEDURE — 86140 C-REACTIVE PROTEIN: CPT

## 2025-08-28 PROCEDURE — 85651 RBC SED RATE NONAUTOMATED: CPT

## 2025-08-28 PROCEDURE — 84460 ALANINE AMINO (ALT) (SGPT): CPT

## 2025-09-03 ENCOUNTER — LAB VISIT (OUTPATIENT)
Dept: LAB | Facility: HOSPITAL | Age: 45
End: 2025-09-03
Attending: INTERNAL MEDICINE
Payer: MEDICAID

## 2025-09-03 DIAGNOSIS — Z79.631 METHOTREXATE, LONG TERM, CURRENT USE: ICD-10-CM

## 2025-09-03 LAB
ABSOLUTE EOSINOPHIL (OHS): 0.03 K/UL
ABSOLUTE MONOCYTE (OHS): 0.43 K/UL (ref 0.3–1)
ABSOLUTE NEUTROPHIL COUNT (OHS): 7.07 K/UL (ref 1.8–7.7)
BASOPHILS # BLD AUTO: 0.04 K/UL
BASOPHILS NFR BLD AUTO: 0.5 %
ERYTHROCYTE [DISTWIDTH] IN BLOOD BY AUTOMATED COUNT: 12.6 % (ref 11.5–14.5)
HCT VFR BLD AUTO: 44 % (ref 40–54)
HGB BLD-MCNC: 14.8 GM/DL (ref 14–18)
IMM GRANULOCYTES # BLD AUTO: 0.04 K/UL (ref 0–0.04)
IMM GRANULOCYTES NFR BLD AUTO: 0.5 % (ref 0–0.5)
LYMPHOCYTES # BLD AUTO: 0.93 K/UL (ref 1–4.8)
MCH RBC QN AUTO: 28.3 PG (ref 27–31)
MCHC RBC AUTO-ENTMCNC: 33.6 G/DL (ref 32–36)
MCV RBC AUTO: 84 FL (ref 82–98)
NUCLEATED RBC (/100WBC) (OHS): 0 /100 WBC
PLATELET # BLD AUTO: 339 K/UL (ref 150–450)
PMV BLD AUTO: 10 FL (ref 9.2–12.9)
RBC # BLD AUTO: 5.23 M/UL (ref 4.6–6.2)
RELATIVE EOSINOPHIL (OHS): 0.4 %
RELATIVE LYMPHOCYTE (OHS): 10.9 % (ref 18–48)
RELATIVE MONOCYTE (OHS): 5 % (ref 4–15)
RELATIVE NEUTROPHIL (OHS): 82.7 % (ref 38–73)
WBC # BLD AUTO: 8.54 K/UL (ref 3.9–12.7)

## 2025-09-03 PROCEDURE — 85025 COMPLETE CBC W/AUTO DIFF WBC: CPT

## 2025-09-03 PROCEDURE — 36415 COLL VENOUS BLD VENIPUNCTURE: CPT

## (undated) DEVICE — SOL BETADINE 5%

## (undated) DEVICE — MARKER SKIN FINE TIP

## (undated) DEVICE — SUT 6/0 18IN COATED VICRYL

## (undated) DEVICE — FORCEP CURVED DISP

## (undated) DEVICE — SUT 6/0 18IN PLAIN GUT D/A

## (undated) DEVICE — CORD BIPOLAR 12 FOOT

## (undated) DEVICE — TRAY MUSCLE LID EYE

## (undated) DEVICE — DRAPE STRABISMUS STRL 40X48IN

## (undated) DEVICE — DRESSING TRANS 2X2 TEGADERM